# Patient Record
Sex: FEMALE | Race: WHITE | NOT HISPANIC OR LATINO | Employment: OTHER | URBAN - METROPOLITAN AREA
[De-identification: names, ages, dates, MRNs, and addresses within clinical notes are randomized per-mention and may not be internally consistent; named-entity substitution may affect disease eponyms.]

---

## 2017-03-12 ENCOUNTER — HOSPITAL ENCOUNTER (OUTPATIENT)
Dept: RADIOLOGY | Facility: MEDICAL CENTER | Age: 72
End: 2017-03-12
Attending: FAMILY MEDICINE
Payer: MEDICARE

## 2017-03-12 DIAGNOSIS — R26.9 ABNORMALITY OF GAIT AND MOBILITY: ICD-10-CM

## 2017-03-12 PROCEDURE — 70553 MRI BRAIN STEM W/O & W/DYE: CPT

## 2017-03-12 PROCEDURE — 700117 HCHG RX CONTRAST REV CODE 255: Performed by: FAMILY MEDICINE

## 2017-03-12 PROCEDURE — A9579 GAD-BASE MR CONTRAST NOS,1ML: HCPCS | Performed by: FAMILY MEDICINE

## 2017-03-12 RX ADMIN — GADODIAMIDE 20 ML: 287 INJECTION INTRAVENOUS at 10:53

## 2017-03-30 ENCOUNTER — HOSPITAL ENCOUNTER (OUTPATIENT)
Dept: RADIOLOGY | Facility: MEDICAL CENTER | Age: 72
End: 2017-03-30
Attending: FAMILY MEDICINE
Payer: MEDICARE

## 2017-03-30 DIAGNOSIS — R26.9 ABNORMALITY OF GAIT: ICD-10-CM

## 2017-03-30 PROCEDURE — 72141 MRI NECK SPINE W/O DYE: CPT

## 2017-03-30 PROCEDURE — 72040 X-RAY EXAM NECK SPINE 2-3 VW: CPT

## 2017-06-01 ENCOUNTER — HOSPITAL ENCOUNTER (OUTPATIENT)
Dept: PHYSICAL THERAPY | Facility: REHABILITATION | Age: 72
End: 2017-06-01
Attending: SPECIALIST
Payer: MEDICARE

## 2017-06-01 PROCEDURE — G8978 MOBILITY CURRENT STATUS: HCPCS | Mod: CK

## 2017-06-01 PROCEDURE — 95992 CANALITH REPOSITIONING PROC: CPT

## 2017-06-01 PROCEDURE — G8979 MOBILITY GOAL STATUS: HCPCS | Mod: CI

## 2017-06-01 PROCEDURE — 97162 PT EVAL MOD COMPLEX 30 MIN: CPT

## 2017-06-05 ENCOUNTER — HOSPITAL ENCOUNTER (OUTPATIENT)
Dept: PHYSICAL THERAPY | Facility: REHABILITATION | Age: 72
End: 2017-06-05
Attending: SPECIALIST
Payer: MEDICARE

## 2017-06-05 PROCEDURE — 97112 NEUROMUSCULAR REEDUCATION: CPT

## 2017-06-05 PROCEDURE — 97110 THERAPEUTIC EXERCISES: CPT

## 2017-06-08 ENCOUNTER — HOSPITAL ENCOUNTER (OUTPATIENT)
Dept: PHYSICAL THERAPY | Facility: REHABILITATION | Age: 72
End: 2017-06-08
Attending: SPECIALIST
Payer: MEDICARE

## 2017-06-08 PROCEDURE — 97112 NEUROMUSCULAR REEDUCATION: CPT

## 2017-06-12 ENCOUNTER — HOSPITAL ENCOUNTER (OUTPATIENT)
Dept: PHYSICAL THERAPY | Facility: REHABILITATION | Age: 72
End: 2017-06-12
Attending: SPECIALIST
Payer: MEDICARE

## 2017-06-12 PROCEDURE — 97112 NEUROMUSCULAR REEDUCATION: CPT

## 2017-06-14 ENCOUNTER — HOSPITAL ENCOUNTER (OUTPATIENT)
Dept: PHYSICAL THERAPY | Facility: REHABILITATION | Age: 72
End: 2017-06-14
Attending: SPECIALIST
Payer: MEDICARE

## 2017-06-14 PROCEDURE — 97110 THERAPEUTIC EXERCISES: CPT | Mod: XU

## 2017-06-14 PROCEDURE — 95992 CANALITH REPOSITIONING PROC: CPT

## 2017-06-19 ENCOUNTER — APPOINTMENT (OUTPATIENT)
Dept: PHYSICAL THERAPY | Facility: REHABILITATION | Age: 72
End: 2017-06-19
Attending: SPECIALIST
Payer: MEDICARE

## 2017-06-21 ENCOUNTER — APPOINTMENT (OUTPATIENT)
Dept: PHYSICAL THERAPY | Facility: REHABILITATION | Age: 72
End: 2017-06-21
Attending: SPECIALIST
Payer: MEDICARE

## 2017-06-23 ENCOUNTER — HOSPITAL ENCOUNTER (OUTPATIENT)
Dept: RADIOLOGY | Facility: MEDICAL CENTER | Age: 72
End: 2017-06-23
Attending: FAMILY MEDICINE
Payer: MEDICARE

## 2017-06-23 DIAGNOSIS — Z12.31 VISIT FOR SCREENING MAMMOGRAM: ICD-10-CM

## 2017-06-23 PROCEDURE — 77063 BREAST TOMOSYNTHESIS BI: CPT

## 2017-06-26 ENCOUNTER — APPOINTMENT (OUTPATIENT)
Dept: PHYSICAL THERAPY | Facility: REHABILITATION | Age: 72
End: 2017-06-26
Attending: SPECIALIST
Payer: MEDICARE

## 2018-03-26 ENCOUNTER — OFFICE VISIT (OUTPATIENT)
Dept: DERMATOLOGY | Facility: IMAGING CENTER | Age: 73
End: 2018-03-26
Payer: MEDICARE

## 2018-03-26 VITALS — TEMPERATURE: 97.5 F | WEIGHT: 195 LBS | BODY MASS INDEX: 29.55 KG/M2 | HEIGHT: 68 IN

## 2018-03-26 DIAGNOSIS — L57.0 ACTINIC KERATOSES: ICD-10-CM

## 2018-03-26 DIAGNOSIS — L71.8 ACNE ROSACEA, ERYTHEMATOUS TELANGIECTATIC TYPE: ICD-10-CM

## 2018-03-26 DIAGNOSIS — L82.1 SEBORRHEIC KERATOSES: ICD-10-CM

## 2018-03-26 DIAGNOSIS — L30.9 DERMATITIS: ICD-10-CM

## 2018-03-26 PROCEDURE — 17000 DESTRUCT PREMALG LESION: CPT | Performed by: DERMATOLOGY

## 2018-03-26 PROCEDURE — 99203 OFFICE O/P NEW LOW 30 MIN: CPT | Mod: 25 | Performed by: DERMATOLOGY

## 2018-03-26 PROCEDURE — 17003 DESTRUCT PREMALG LES 2-14: CPT | Performed by: DERMATOLOGY

## 2018-03-26 RX ORDER — TRIAMCINOLONE ACETONIDE 0.25 MG/G
1 OINTMENT TOPICAL 2 TIMES DAILY
Qty: 30 G | Refills: 1 | Status: SHIPPED | OUTPATIENT
Start: 2018-03-26 | End: 2022-02-08

## 2018-03-26 RX ORDER — SOLIFENACIN SUCCINATE 5 MG/1
10 TABLET, FILM COATED ORAL DAILY
COMMUNITY
End: 2019-02-08

## 2018-03-26 ASSESSMENT — ENCOUNTER SYMPTOMS
WEIGHT LOSS: 0
CHILLS: 0
FEVER: 0

## 2018-03-26 NOTE — PROGRESS NOTES
Dermatology New Patient Visit    Chief Complaint   Patient presents with   • Other     Skin cancer screening       Subjective:     HPI:   Ashanti Day is a 72 y.o. female presenting for    Full body skin exam - patient has h/o several precancerous lesions  Last exam a year ago   Rough spots on the forehead  No recent change in size  No itching/bleeding/pain  No treatments    Tan spots all over the body  Present for years  Has had a few frozen in the past    Rash on central chest  Present for 1 week  Not very itchy  No aggravating/alleviating factors  No treatments    Redness of the central face  Issue for 5+ years  No aggravating/alleviating factors  No itching/bleeding/pain  Was given a rosacea cream ~5 years ago, did not help very much  Not regularly using sunscreen    History of skin cancer: Yes, Details:  Precancerous  skin lesion removed from face x 5 years ago   History of biopsies:Yes, Details:    History of blistering/severe sunburns:No  Family history of skin cancer:No  Family history of atypical moles:No        Past Medical History:   Diagnosis Date   • Anesthesia     nausea   • Arthritis    • Breast cancer    • Bronchitis 1960's   • Cancer 2012    ca left breast   • Fibroid uterus 1979    resulting in Hysterectomy   • Heart burn    • Hepatitis A     at  age 6 months   • High cholesterol    • Ovarian mass current-2012    on each ovary, uterine fibroids   • Pneumonia 2003   • Sleep apnea     CPAP   • Snoring     SLEEP STUDY DONE   • Unspecified urinary incontinence        No current outpatient prescriptions on file prior to visit.     No current facility-administered medications on file prior to visit.        No Known Allergies    No family history on file.    Social History     Social History   • Marital status:      Spouse name: N/A   • Number of children: N/A   • Years of education: N/A     Occupational History   • Not on file.     Social History Main Topics   • Smoking status: Former  "Smoker     Packs/day: 1.00     Years: 30.00     Types: Cigarettes     Quit date: 1/5/1992   • Smokeless tobacco: Not on file   • Alcohol use No   • Drug use: No   • Sexual activity: Not on file     Other Topics Concern   • Family Contact Information Yes     Social History Narrative   • No narrative on file       Review of Systems   Constitutional: Negative for chills, fever and weight loss.   Skin: Positive for rash. Negative for itching.   All other systems reviewed and are negative.       Objective:     A full mucocutaneous exam was completed including: scalp, hair, ears, face, eyelids, conjunctiva, lips, gums/tongue/oropharynx, neck, chest breasts, abdomen, back, left and right upper extremities (including hands/digits and fingernails), left and right lower extremities (including feet/toes, toenails), buttocks, excluding external genitalia (patient refusal) with the following pertinent findings listed below. Remaining above-listed examined areas within normal limits / negative for rashes or lesions.    Temperature 36.4 °C (97.5 °F), height 1.727 m (5' 8\"), weight 88.5 kg (195 lb), last menstrual period 01/04/1979.    Physical Exam   Constitutional: She is oriented to person, place, and time and well-developed, well-nourished, and in no distress.   HENT:   Head: Normocephalic and atraumatic.       Right Ear: External ear normal.   Left Ear: External ear normal.   Nose: Nose normal.   Mouth/Throat: Oropharynx is clear and moist.   Eyes: Conjunctivae and lids are normal.   Neck: Normal range of motion. Neck supple.   Cardiovascular: Intact distal pulses.    Pulmonary/Chest: Effort normal.   Neurological: She is alert and oriented to person, place, and time.   Skin: Skin is warm and dry.        Psychiatric: Mood and affect normal.   Vitals reviewed.      DATA: none applicable to review    Assessment and Plan:     1. Actinic keratoses - forehead, temple  CRYOTHERAPY:  Discussed risks and benefits of cryotherapy. " Patient verbally agreed. 2 applications of cryotherapy were applied to 4 lesions on the forehead, temples. Patient tolerated procedure well. Aftercare instructions given.    2. Dermatitis - upper abdomen  - start triamcinolone 0.1% ointment to affected area of the body twice daily until improved. Side effects discussed, including skin thinning, appearance of stretch marks (striae), easy bruising, telangiectasias, and possible increased hair growth     3. Seborrheic keratoses  - Benign-appearing nature of lesions discussed. Advised to return to clinic for any new or concerning changes.  - ABCDE's of melanoma discussed    4. Rosacea, erythematous telangiectatic type  - educated patient about diagnosis, management options, and expectations of treatment  - discussed option of Mirvaso or Rhofade, but patient declines today  - discussed importance of regular sun protection/sunscreen use, SPF 30 or greater with broad spectrum coverage, need for reapplication every  minutes  - trigger avoidance  - discussed that the redness/flushing is more difficult to treat, and discussed other topicals vs BBL vs laser therapy    Followup: Return in about 1 year (around 3/26/2019) for lizzie.    Priscila Lemus M.D.

## 2018-04-12 ENCOUNTER — APPOINTMENT (OUTPATIENT)
Dept: RADIOLOGY | Facility: MEDICAL CENTER | Age: 73
End: 2018-04-12
Attending: PSYCHIATRY & NEUROLOGY
Payer: MEDICARE

## 2018-04-12 DIAGNOSIS — G95.9 MYELOPATHY (HCC): ICD-10-CM

## 2018-04-12 PROCEDURE — 72141 MRI NECK SPINE W/O DYE: CPT

## 2018-05-22 ENCOUNTER — HOSPITAL ENCOUNTER (OUTPATIENT)
Dept: RADIOLOGY | Facility: MEDICAL CENTER | Age: 73
End: 2018-05-22
Attending: FAMILY MEDICINE
Payer: MEDICARE

## 2018-05-22 DIAGNOSIS — M54.50 LOW BACK PAIN WITHOUT SCIATICA, UNSPECIFIED BACK PAIN LATERALITY, UNSPECIFIED CHRONICITY: ICD-10-CM

## 2018-05-22 PROCEDURE — 72100 X-RAY EXAM L-S SPINE 2/3 VWS: CPT

## 2018-06-09 ENCOUNTER — HOSPITAL ENCOUNTER (OUTPATIENT)
Dept: RADIOLOGY | Facility: MEDICAL CENTER | Age: 73
End: 2018-06-09
Attending: PSYCHIATRY & NEUROLOGY
Payer: MEDICARE

## 2018-06-09 DIAGNOSIS — R27.0 ATAXIA: ICD-10-CM

## 2018-06-09 PROCEDURE — 70551 MRI BRAIN STEM W/O DYE: CPT

## 2018-06-28 ENCOUNTER — HOSPITAL ENCOUNTER (OUTPATIENT)
Dept: RADIOLOGY | Facility: MEDICAL CENTER | Age: 73
End: 2018-06-28
Attending: FAMILY MEDICINE
Payer: MEDICARE

## 2018-06-28 ENCOUNTER — HOSPITAL ENCOUNTER (OUTPATIENT)
Dept: RADIOLOGY | Facility: MEDICAL CENTER | Age: 73
End: 2018-06-28
Attending: PSYCHIATRY & NEUROLOGY
Payer: MEDICARE

## 2018-06-28 DIAGNOSIS — M54.5 LOW BACK PAIN, UNSPECIFIED BACK PAIN LATERALITY, UNSPECIFIED CHRONICITY, WITH SCIATICA PRESENCE UNSPECIFIED: ICD-10-CM

## 2018-06-28 DIAGNOSIS — Z12.39 SCREENING FOR BREAST CANCER: ICD-10-CM

## 2018-06-28 PROCEDURE — 77063 BREAST TOMOSYNTHESIS BI: CPT

## 2018-06-28 PROCEDURE — 72100 X-RAY EXAM L-S SPINE 2/3 VWS: CPT

## 2018-07-02 ENCOUNTER — HOSPITAL ENCOUNTER (OUTPATIENT)
Dept: RADIOLOGY | Facility: MEDICAL CENTER | Age: 73
End: 2018-07-02
Attending: FAMILY MEDICINE
Payer: MEDICARE

## 2018-07-02 DIAGNOSIS — Z78.0 MENOPAUSE: ICD-10-CM

## 2018-07-02 PROCEDURE — 77080 DXA BONE DENSITY AXIAL: CPT

## 2018-07-11 ENCOUNTER — HOSPITAL ENCOUNTER (OUTPATIENT)
Dept: RADIOLOGY | Facility: MEDICAL CENTER | Age: 73
End: 2018-07-11
Attending: PSYCHIATRY & NEUROLOGY
Payer: MEDICARE

## 2018-07-11 DIAGNOSIS — M54.50 LOW BACK PAIN WITHOUT SCIATICA, UNSPECIFIED BACK PAIN LATERALITY, UNSPECIFIED CHRONICITY: ICD-10-CM

## 2018-07-11 PROCEDURE — 72148 MRI LUMBAR SPINE W/O DYE: CPT

## 2018-10-09 ENCOUNTER — HOSPITAL ENCOUNTER (OUTPATIENT)
Dept: RADIOLOGY | Facility: MEDICAL CENTER | Age: 73
End: 2018-10-09
Attending: FAMILY MEDICINE
Payer: MEDICARE

## 2018-10-09 DIAGNOSIS — R06.03 ACUTE RESPIRATORY DISTRESS: ICD-10-CM

## 2018-10-09 PROCEDURE — 71046 X-RAY EXAM CHEST 2 VIEWS: CPT

## 2018-12-10 ENCOUNTER — TELEPHONE (OUTPATIENT)
Dept: PULMONOLOGY | Facility: HOSPICE | Age: 73
End: 2018-12-10

## 2018-12-10 DIAGNOSIS — R06.02 SOB (SHORTNESS OF BREATH): Primary | ICD-10-CM

## 2018-12-12 ENCOUNTER — OFFICE VISIT (OUTPATIENT)
Dept: PULMONOLOGY | Facility: HOSPICE | Age: 73
End: 2018-12-12
Payer: MEDICARE

## 2018-12-12 ENCOUNTER — NON-PROVIDER VISIT (OUTPATIENT)
Dept: PULMONOLOGY | Facility: HOSPICE | Age: 73
End: 2018-12-12
Payer: MEDICARE

## 2018-12-12 VITALS
TEMPERATURE: 99.1 F | OXYGEN SATURATION: 90 % | HEART RATE: 88 BPM | DIASTOLIC BLOOD PRESSURE: 90 MMHG | BODY MASS INDEX: 30.62 KG/M2 | SYSTOLIC BLOOD PRESSURE: 142 MMHG | RESPIRATION RATE: 16 BRPM | WEIGHT: 202 LBS | HEIGHT: 68 IN

## 2018-12-12 VITALS — WEIGHT: 202 LBS | BODY MASS INDEX: 30.71 KG/M2

## 2018-12-12 DIAGNOSIS — R06.02 SHORTNESS OF BREATH: ICD-10-CM

## 2018-12-12 DIAGNOSIS — R06.02 SOB (SHORTNESS OF BREATH): ICD-10-CM

## 2018-12-12 PROCEDURE — 94726 PLETHYSMOGRAPHY LUNG VOLUMES: CPT | Performed by: INTERNAL MEDICINE

## 2018-12-12 PROCEDURE — 94060 EVALUATION OF WHEEZING: CPT | Performed by: INTERNAL MEDICINE

## 2018-12-12 PROCEDURE — 94729 DIFFUSING CAPACITY: CPT | Performed by: INTERNAL MEDICINE

## 2018-12-12 PROCEDURE — 99204 OFFICE O/P NEW MOD 45 MIN: CPT | Mod: 25 | Performed by: INTERNAL MEDICINE

## 2018-12-12 RX ORDER — SOLIFENACIN SUCCINATE 10 MG/1
TABLET, FILM COATED ORAL
COMMUNITY
Start: 2018-11-11 | End: 2022-02-08

## 2018-12-12 RX ORDER — INFLUENZA A VIRUS A/MICHIGAN/45/2015 X-275 (H1N1) ANTIGEN (FORMALDEHYDE INACTIVATED), INFLUENZA A VIRUS A/SINGAPORE/INFIMH-16-0019/2016 IVR-186 (H3N2) ANTIGEN (FORMALDEHYDE INACTIVATED), AND INFLUENZA B VIRUS B/MARYLAND/15/2016 BX-69A (A B/COLORADO/6/2017-LIKE VIRUS) ANTIGEN (FORMALDEHYDE INACTIVATED) 60; 60; 60 UG/.5ML; UG/.5ML; UG/.5ML
INJECTION, SUSPENSION INTRAMUSCULAR
Refills: 0 | COMMUNITY
Start: 2018-09-25 | End: 2019-02-08

## 2018-12-12 ASSESSMENT — PULMONARY FUNCTION TESTS
FEV1/FVC_PERCENT_PREDICTED: 106
FEV1/FVC_PERCENT_PREDICTED: 106
FEV1/FVC_PERCENT_CHANGE: 4
FEV1/FVC_PERCENT_PREDICTED: 75
FEV1/FVC: 76
FEV1_LLN: 2.10
FEV1/FVC: 79.58
FEV1/FVC: 80
FEV1_PERCENT_PREDICTED: 87
FEV1_PREDICTED: 2.51
FVC_LLN: 2.78
FEV1/FVC: 76
FVC: 2.84
FEV1/FVC_PREDICTED: 75
FVC_PREDICTED: 3.33
FEV1/FVC_PERCENT_CHANGE: -200
FEV1/FVC_PERCENT_PREDICTED: 101
FEV1/FVC_PERCENT_LLN: 63
FVC_PERCENT_PREDICTED: 86
FEV1/FVC_PERCENT_PREDICTED: 101
FEV1_PERCENT_CHANGE: 2
FEV1: 2.26
FVC: 2.89
FEV1_PERCENT_CHANGE: -1
FVC_PERCENT_PREDICTED: 85
FEV1_PERCENT_PREDICTED: 90
FEV1: 2.2

## 2018-12-12 ASSESSMENT — ENCOUNTER SYMPTOMS
EYES NEGATIVE: 1
CARDIOVASCULAR NEGATIVE: 1
HEMOPTYSIS: 0
SPEECH CHANGE: 1
WHEEZING: 0
COUGH: 1
SHORTNESS OF BREATH: 1
HEARTBURN: 1
WEAKNESS: 1
FOCAL WEAKNESS: 1
TREMORS: 0
SPUTUM PRODUCTION: 0
FALLS: 1

## 2018-12-12 ASSESSMENT — PATIENT HEALTH QUESTIONNAIRE - PHQ9: CLINICAL INTERPRETATION OF PHQ2 SCORE: 0

## 2018-12-12 NOTE — ASSESSMENT & PLAN NOTE
I do not think patient has parenchymal lung disease.  Shortness of breath at least per history and exam smoke consistent with upper body weakness and visits with cooking and making her bed versus actually just talking and walking.  This may be related to her underlying neurological deficit that is ongoing with her balance and lower extremity weakness.  Her speech is also affected where she is having problems finding words.  And she is working with a neurologist.  Reviewed results with patient pulmonary function testing completely normal.    Thus think her shortness of breath is related to upper body muscle weakness and not parenchymal lung disease.  Weakness is not severe enough to reduce her total lung capacity or her maximum voluntary ventilation.

## 2018-12-12 NOTE — PROGRESS NOTES
Pulmonary Consultation    Date of Service: 2018    Consulting Physician: Judy Waite M.D.    Reason for consult:  Establish Care and Shortness of Breath      Problem List Items Addressed This Visit     Shortness of breath     I do not think patient has parenchymal lung disease.  Shortness of breath at least per history and exam smoke consistent with upper body weakness and visits with cooking and making her bed versus actually just talking and walking.  This may be related to her underlying neurological deficit that is ongoing with her balance and lower extremity weakness.  Her speech is also affected where she is having problems finding words.  And she is working with a neurologist.  Reviewed results with patient pulmonary function testing completely normal.    Thus think her shortness of breath is related to upper body muscle weakness and not parenchymal lung disease.  Weakness is not severe enough to reduce her total lung capacity or her maximum voluntary ventilation.                 History of Present Illness: Ashanti Day is a 73 y.o. female with a past medical history of Breast ca, JAVIER that is non compliant   Referred for SOB  Started last 2 weeks of her radiation therapy for breast cancer on the left in   SOB not with walking and not with sitting  She gets it with cooking or making her bed  Not associated with wheeze  Dry cough  Difficulty speaking, writing and balance -- neurology is working with her      Exercise tolerance:  Walking distance: balance is the issue - so does not walk much as broken tail bone and collar bone  [place]: can't due to balance      Night time symptoms: none    Pulmonary History:    Environmental exposures: no hot tub; no woodstove, no mining work, and no asbestos exposure      Originally from [place] moved to [place]   Pets: no  Occupation History:as above admin work  Family history of pulmonary disease: mother who  yesterday no lung  problems  Second hand smoke exposure: none    Quit 1991 35pk year    Review of Systems   Constitutional: Positive for malaise/fatigue.   HENT: Negative.    Eyes: Negative.    Respiratory: Positive for cough and shortness of breath. Negative for hemoptysis, sputum production and wheezing.         Her cough is occasional and nonproductive and it is usually when she is having the shortness of breath and after she recovers she will cough a couple of times.   Cardiovascular: Negative.    Gastrointestinal: Positive for heartburn.   Genitourinary: Negative.    Musculoskeletal: Positive for falls.   Skin: Negative.    Neurological: Positive for speech change, focal weakness and weakness. Negative for tremors.   Endo/Heme/Allergies: Negative.        Current Outpatient Prescriptions on File Prior to Visit   Medication Sig Dispense Refill   • solifenacin (VESICARE) 5 MG tablet Take 10 mg by mouth every day.     • Famotidine (PEPCID PO) Take  by mouth.     • triamcinolone (KENALOG) 0.025 % ointment Apply 1 Application to affected area(s) 2 times a day. 30 g 1     No current facility-administered medications on file prior to visit.        Social History   Substance Use Topics   • Smoking status: Former Smoker     Packs/day: 1.00     Years: 35.00     Types: Cigarettes     Quit date: 1/5/1992   • Smokeless tobacco: Never Used   • Alcohol use No        Past Medical History:   Diagnosis Date   • Anesthesia     nausea   • Arthritis    • Breast cancer (HCC)    • Bronchitis 1960's   • Cancer (HCC) 2012    ca left breast   • Fibroid uterus 1979    resulting in Hysterectomy   • Heart burn    • Hepatitis A     at  age 6 months   • High cholesterol    • Ovarian mass current-2012    on each ovary, uterine fibroids   • Pneumonia 2003   • Sleep apnea     CPAP   • Snoring     SLEEP STUDY DONE   • Unspecified urinary incontinence        Past Surgical History:   Procedure Laterality Date   • ESWT Right 5/13/2015    Procedure: EXTRACORPOREAL  "SHOCK WAVE THERAPY;  Surgeon: KRIS Fernandez D.P.M.;  Location: SURGERY [facility];  Service:    • NODE BIOPSY SENTINEL  12/7/2012    Performed by Angélica Jackson M.D. at SURGERY [facility] ORS   • BREAST BIOPSY  12/7/2012    Performed by Angélica Jackson M.D. at SURGERY [facility]   • BREAST BIOPSY  11/20/2012    Performed by Angélica Jackson M.D. at SURGERY SAME DAY [facility]   • LAPAROSCOPY  1/5/2012    Performed by REECE FLORES at SURGERY [facility] ORS   • EXPLORATORY LAPAROTOMY  1/5/2012    Performed by REECE FLORES at SURGERY [facility]   • SHOULDER SURGERY  july 2011    Right shoulder. with tendon repair   • LUMBAR DECOMPRESSION  1995   • HYSTERECTOMY RADICAL  1979   • OTHER ORTHOPEDIC SURGERY  1977    cervical laminectomy infusion   • LUMPECTOMY     • PB RADIATION THERAPY PLAN SIMPLE         Allergies: Patient has no known allergies.    History reviewed. No pertinent family history.    Vitals:    12/12/18 0901 12/12/18 0908   Height: 1.727 m (5' 8\")    Weight: 91.6 kg (202 lb)    Weight % change since last entry.: 0 %    BP: 142/90    Pulse: 88    BMI (Calculated): 30.71    Resp: 16    Temp: 37.3 °C (99.1 °F)    TempSrc: Temporal    O2 sat % room air:  (!) 90 %       Physical Examination  Physical Exam   Constitutional: She is oriented to person, place, and time. No distress.   HENT:   Head: Normocephalic and atraumatic.   Right Ear: External ear normal.   Left Ear: External ear normal.   Mouth/Throat: Oropharynx is clear and moist.   Eyes: Pupils are equal, round, and reactive to light. Conjunctivae and EOM are normal.   Neck: Normal range of motion. Neck supple. No JVD present. No tracheal deviation present. No thyromegaly present.   Cardiovascular: Normal rate, regular rhythm and intact distal pulses.  Exam reveals no gallop and no friction rub.    No murmur heard.  Pulmonary/Chest: No stridor. No respiratory distress. She has no wheezes. She has no rales. She exhibits no " tenderness.   Abdominal: Soft. Bowel sounds are normal.   Musculoskeletal: She exhibits no edema, tenderness or deformity.   To get up from seated position she has to use her upper body strength   Legs 4/5 b/l   Lymphadenopathy:     She has no cervical adenopathy.   Neurological: She is alert and oriented to person, place, and time. No cranial nerve deficit. Coordination normal. GCS score is 15.   Uses a cane to walk   Skin: Skin is warm and dry. No rash noted. She is not diaphoretic.   Psychiatric: Mood, affect and judgment normal.         Results:    Pulmonary function tests  Acceptable and reproducible  FEV1 2.2 L in brackets 87%], FVC 2.89 L in brackets 86%], ratio 76%  Flow volume loops relatively normal-appearing  Total lung capacity 5.65 L [99%] 5 DLCO 23.04 mL's per minute millimeter mercury [112%]    Impression normal pulmonary  function testing with no response to bronchodilators  Other pertinent testing:  CT scan done in December 2013 of the abdomen I could see the lower lungs in the parenchyma of the lungs appear normal      Armani Dietrich MD MPH [facility]  [facility]

## 2018-12-12 NOTE — PROCEDURES
Technician Reny Lezama, [facility] Comments:Good patient effort & cooperation.  The results of this test meet the ATS/ERS standards for acceptability & reproducibility.  Test was performed on the Smart Surgical Body Plethysmograph-Elite DX system.  Predicted values were NHanes-3 for spirometry, [facility] for DLCO, ITS for Lung Volumes.  The DLCO was uncorrected for Hgb.  A bronchodilator of Xopenex HFA -2puffs via spacer administered.  DLCO performed during dilation period.    Acceptable and reproducible  FEV1 2.2 L in brackets 87%], FVC 2.89 L in brackets 86%], ratio 76%  Flow volume loops relatively normal-appearing  Total lung capacity 5.65 L [99%] 5 DLCO 23.04 mL's per minute millimeter mercury [112%]     Impression normal pulmonary  function testing with no response to bronchodilators    Interpretation:

## 2018-12-27 ENCOUNTER — TELEPHONE (OUTPATIENT)
Dept: CARDIOLOGY | Facility: MEDICAL CENTER | Age: 73
End: 2018-12-27

## 2018-12-27 NOTE — TELEPHONE ENCOUNTER
I sent an MICHAEL to [facility] to obtain all prior records for the patient's appointment with Dr. Loyd on 12/31/18 @ 1300. Records will be sent to [phone].    [facility]:  Tel: [phone]  Fax: [phone]

## 2019-02-08 ENCOUNTER — OFFICE VISIT (OUTPATIENT)
Dept: CARDIOLOGY | Facility: MEDICAL CENTER | Age: 74
End: 2019-02-08
Payer: MEDICARE

## 2019-02-08 VITALS
WEIGHT: 195 LBS | BODY MASS INDEX: 29.55 KG/M2 | HEIGHT: 68 IN | SYSTOLIC BLOOD PRESSURE: 144 MMHG | DIASTOLIC BLOOD PRESSURE: 84 MMHG | HEART RATE: 96 BPM

## 2019-02-08 DIAGNOSIS — R94.31 NONSPECIFIC ABNORMAL ELECTROCARDIOGRAM (ECG) (EKG): Primary | ICD-10-CM

## 2019-02-08 DIAGNOSIS — R06.09 DOE (DYSPNEA ON EXERTION): ICD-10-CM

## 2019-02-08 DIAGNOSIS — R94.31 ABNORMAL EKG: ICD-10-CM

## 2019-02-08 LAB — EKG IMPRESSION: NORMAL

## 2019-02-08 PROCEDURE — 93000 ELECTROCARDIOGRAM COMPLETE: CPT | Performed by: INTERNAL MEDICINE

## 2019-02-08 PROCEDURE — 99204 OFFICE O/P NEW MOD 45 MIN: CPT | Performed by: INTERNAL MEDICINE

## 2019-02-08 ASSESSMENT — ENCOUNTER SYMPTOMS
ABDOMINAL PAIN: 0
FALLS: 1
SHORTNESS OF BREATH: 1
LOSS OF CONSCIOUSNESS: 0
CHILLS: 0
FEVER: 1
ORTHOPNEA: 0
MYALGIAS: 0
PALPITATIONS: 0
BLURRED VISION: 0
HEARTBURN: 1
INSOMNIA: 0
PND: 0
DIZZINESS: 0
SPEECH CHANGE: 1

## 2019-02-08 NOTE — PROGRESS NOTES
Chief Complaint   Patient presents with   •  Dyspnea on exertion       Subjective:   Ashanti Day is a 73 y.o. female who presents today is referred by her PCP Dr.Ambika JOSE Waite for a cardiology consultation for evaluation of chronic shortness of breath and an abnormal EKG.    The patient has significant past medical history of dyspnea on exertion since 2014.  At that time she was seen by Dr. Edelmira Toledo, [facility] cardiologist and had an echocardiogram and a nuclear stress test both of which were felt to be unremarkable.  Dr. Toledo met with the patient indicated that she did not feel that the patient had a cardiac etiology to explain her shortness of breath.  Also in 2014 she was seen by [facility].  Sleep study showed moderately severe sleep apnea however the patient does not tolerate the CPAP device being only able to use it an hour at night.  PFTs were normal.    The patient reports continued significant exertional shortness of breath with minimal activity such as daily chores, making her bed or running errands.  She denies any associated chest neck or arm discomfort.  No PND, orthopnea or lower extremity edema.    Additionally she has a history of dyslipidemia, GE reflux disease, spinal arthritis, C-spine fusion 1979, recurrent falls resulting in sacral fractures, breast cancer with left lumpectomy and radiation therapy in 2013.    She is been seen by Dr. Darrel Francois, neurologist for balance problems and lower extremity weakness.    Family history negative for heart disease.    Social history  Has a live-in roommate.  Previously smoked 35 pack years quit 20 years ago.    Past Medical History:   Diagnosis Date   • Anesthesia     nausea   • Arthritis    • Breast cancer (HCC)    • Bronchitis 1960's   • Cancer (HCC) 2012    ca left breast   • Fibroid uterus 1979    resulting in Hysterectomy   • Heart burn    • Hepatitis A     at  age 6 months   • High cholesterol     • Ovarian mass current-2012    on each ovary, uterine fibroids   • Pneumonia 2003   • Sleep apnea     CPAP   • Snoring     SLEEP STUDY DONE   • Unspecified urinary incontinence      Past Surgical History:   Procedure Laterality Date   • ESWT Right 5/13/2015    Procedure: EXTRACORPOREAL SHOCK WAVE THERAPY;  Surgeon: KRIS Fernandez D.P.M.;  Location: SURGERY [facility];  Service:    • NODE BIOPSY SENTINEL  12/7/2012    Performed by Angélica Jackson M.D. at SURGERY [facility]   • BREAST BIOPSY  12/7/2012    Performed by Angélica Jackson M.D. at SURGERY [facility]   • BREAST BIOPSY  11/20/2012    Performed by Angélica Jackson M.D. at SURGERY SAME DAY [facility]   • LAPAROSCOPY  1/5/2012    Performed by REECE FLORES at SURGERY [facility]   • EXPLORATORY LAPAROTOMY  1/5/2012    Performed by REECE FLORES at SURGERY [facility]   • SHOULDER SURGERY  july 2011    Right shoulder. with tendon repair   • LUMBAR DECOMPRESSION  1995   • HYSTERECTOMY RADICAL  1979   • OTHER ORTHOPEDIC SURGERY  1977    cervical laminectomy infusion   • LUMPECTOMY     • PB RADIATION THERAPY PLAN SIMPLE       History reviewed. No pertinent family history.  Social History     Social History   • Marital status:      Spouse name: N/A   • Number of children: N/A   • Years of education: N/A     Occupational History   • Not on file.     Social History Main Topics   • Smoking status: Former Smoker     Packs/day: 1.00     Years: 35.00     Types: Cigarettes     Quit date: 1/5/1992   • Smokeless tobacco: Never Used   • Alcohol use No   • Drug use: No   • Sexual activity: Not on file     Other Topics Concern   • Family Contact Information Yes     Social History Narrative   • No narrative on file     No Known Allergies  Outpatient Encounter Prescriptions as of 2/8/2019   Medication Sig Dispense Refill   • VESICARE 10 MG tablet      • [DISCONTINUED] FLUZONE HIGH-DOSE 0.5 ML Suspension Prefilled Syringe injection TO BE  "ADMINISTERED BY PHARMACIST FOR IMMUNIZATION  0   • [DISCONTINUED] vitamin D (CHOLECALCIFEROL) 1000 UNIT Tab Take 1,000 Units by mouth every day.     • [DISCONTINUED] Calcium Carbonate-Vit D-Min (CALCIUM 1200 PO) Take  by mouth.     • Famotidine (PEPCID PO) Take  by mouth.     • triamcinolone (KENALOG) 0.025 % ointment Apply 1 Application to affected area(s) 2 times a day. (Patient not taking: Reported on 2/8/2019) 30 g 1   • [DISCONTINUED] solifenacin (VESICARE) 5 MG tablet Take 10 mg by mouth every day.       No facility-administered encounter medications on file as of 2/8/2019.      Review of Systems   Constitutional: Positive for fever. Negative for chills.   HENT: Positive for tinnitus. Negative for congestion.    Eyes: Negative for blurred vision.   Respiratory: Positive for shortness of breath.    Cardiovascular: Negative for chest pain, palpitations, orthopnea, leg swelling and PND.   Gastrointestinal: Positive for heartburn. Negative for abdominal pain.   Genitourinary: Positive for urgency. Negative for dysuria.   Musculoskeletal: Positive for falls. Negative for joint pain and myalgias.   Skin: Negative for rash.   Neurological: Positive for speech change. Negative for dizziness and loss of consciousness.   Psychiatric/Behavioral: The patient does not have insomnia.         Objective:   /84 (BP Location: Left arm, Patient Position: Sitting, BP Cuff Size: Adult)   Pulse 96   Ht 1.727 m (5' 8\")   Wt 88.5 kg (195 lb)   LMP 01/04/1979   BMI 29.65 kg/m²     Physical Exam   Constitutional: She is oriented to person, place, and time. She appears well-nourished.   Frail.   HENT:   Head: Normocephalic and atraumatic.   Eyes: Pupils are equal, round, and reactive to light. EOM are normal. No scleral icterus.   Neck: Neck supple. No JVD present. No thyromegaly present.   Anterior neck scar.   Cardiovascular: Normal rate, regular rhythm, normal heart sounds and intact distal pulses.  Exam reveals no gallop " and no friction rub.    No murmur heard.  Pulmonary/Chest: Effort normal and breath sounds normal. No respiratory distress. She has no wheezes. She has no rales.   Abdominal: Soft. Bowel sounds are normal. She exhibits no mass. There is no tenderness.   Musculoskeletal: Normal range of motion. She exhibits no edema or deformity.   Lymphadenopathy:     She has no cervical adenopathy.   Neurological: She is alert and oriented to person, place, and time. No cranial nerve deficit. She exhibits normal muscle tone. Coordination abnormal.   Very insecure with walking.   Skin: Skin is warm and dry.   Psychiatric: She has a normal mood and affect. Her behavior is normal.   ECHOCARDIOGRAM 05/05/2014  Left ventricular ejection fraction 55-60%.  Aortic sclerosis.  Mild diastolic dysfunction.    MPI 02/11/2014  EF 61%.  Normal wall motion.  Moderate decreased apical attenuation at rest and stress most likely breast attenuation.    EKG 02/08/2019 normal sinus rhythm, rate 96, left anterior hemiblock, nonspecific ST-T wave abnormalities.  Reviewed by myself    Assessment:     1. Nonspecific abnormal electrocardiogram (ECG) (EKG)  EKG   2. FRANKLIN (dyspnea on exertion)  NM-CARDIAC STRESS TEST    EC-ECHOCARDIOGRAM COMPLETE W/O CONT   3. Abnormal EKG  NM-CARDIAC STRESS TEST    EC-ECHOCARDIOGRAM COMPLETE W/O CONT       Medical Decision Making:  Today's Assessment / Status / Plan:     Assessment  1.  Dyspnea on exertion, chronic, unclear etiology.  2.  Abnormal EKG with left anterior hemiblock.    Recommendation Discussion  1.  I had a detailed discussion with patient concerning her current symptomatology of exertional shortness of breath,, my uncertainty as to the etiology.  2.  I reviewed her previous diagnostic tests with her including her MPI, EKG, echocardiogram, sleep study chest x-ray and PFTs.  3.  We will repeat echocardiogram and nuclear stress test to make any further recommendations.    Thank you for allowing me see this lady  in consultation

## 2019-02-08 NOTE — LETTER
[facility]   [address] [place] [address]  [place] [address]  Phone: [phone]  Fax: [phone]              Ashanti Day  1945    Encounter Date: 2/8/2019    Tyler Melchor M.D.          PROGRESS NOTE:  Chief Complaint   Patient presents with   •  Dyspnea on exertion       Subjective:   Ashanti Day is a 73 y.o. female who presents today is referred by her PCP Dr.Ambika JOSE Waite for a cardiology consultation for evaluation of chronic shortness of breath and an abnormal EKG.    The patient has significant past medical history of dyspnea on exertion since 2014.  At that time she was seen by Dr. Edelmira Toledo, [facility] cardiologist and had an echocardiogram and a nuclear stress test both of which were felt to be unremarkable.  Dr. Toledo met with the patient indicated that she did not feel that the patient had a cardiac etiology to explain her shortness of breath.  Also in 2014 she was seen by [facility].  Sleep study showed moderately severe sleep apnea however the patient does not tolerate the CPAP device being only able to use it an hour at night.  PFTs were normal.    The patient reports continued significant exertional shortness of breath with minimal activity such as daily chores, making her bed or running errands.  She denies any associated chest neck or arm discomfort.  No PND, orthopnea or lower extremity edema.    Additionally she has a history of dyslipidemia, GE reflux disease, spinal arthritis, C-spine fusion 1979, recurrent falls resulting in sacral fractures, breast cancer with left lumpectomy and radiation therapy in 2013.    She is been seen by Dr. Darrel Francois, neurologist for balance problems and lower extremity weakness.    Family history negative for heart disease.    Social history  Has a live-in roommate.  Previously smoked 35 pack years quit 20 years ago.    Past Medical History:      Diagnosis Date   • Anesthesia     nausea   • Arthritis    • Breast cancer (HCC)    • Bronchitis 1960's   • Cancer (HCC) 2012    ca left breast   • Fibroid uterus 1979    resulting in Hysterectomy   • Heart burn    • Hepatitis A     at  age 6 months   • High cholesterol    • Ovarian mass current-2012    on each ovary, uterine fibroids   • Pneumonia 2003   • Sleep apnea     CPAP   • Snoring     SLEEP STUDY DONE   • Unspecified urinary incontinence      Past Surgical History:   Procedure Laterality Date   • ESWT Right 5/13/2015    Procedure: EXTRACORPOREAL SHOCK WAVE THERAPY;  Surgeon: KRIS eFrnandez D.P.M.;  Location: SURGERY [facility];  Service:    • NODE BIOPSY SENTINEL  12/7/2012    Performed by Angélica Jackson M.D. at SURGERY [facility]   • BREAST BIOPSY  12/7/2012    Performed by Angélica Jackson M.D. at SURGERY [facility]   • BREAST BIOPSY  11/20/2012    Performed by Angélica Jackson M.D. at SURGERY [facility]   • LAPAROSCOPY  1/5/2012    Performed by REECE FLORES at SURGERY [facility]   • EXPLORATORY LAPAROTOMY  1/5/2012    Performed by REECE FLORES at SURGERY [facility]   • SHOULDER SURGERY  july 2011    Right shoulder. with tendon repair   • LUMBAR DECOMPRESSION  1995   • HYSTERECTOMY RADICAL  1979   • OTHER ORTHOPEDIC SURGERY  1977    cervical laminectomy infusion   • LUMPECTOMY     • PB RADIATION THERAPY PLAN SIMPLE       History reviewed. No pertinent family history.  Social History     Social History   • Marital status:      Spouse name: N/A   • Number of children: N/A   • Years of education: N/A     Occupational History   • Not on file.     Social History Main Topics   • Smoking status: Former Smoker     Packs/day: 1.00     Years: 35.00     Types: Cigarettes     Quit date: 1/5/1992   • Smokeless tobacco: Never Used   • Alcohol use No   • Drug use: No   • Sexual activity: Not on file     Other Topics Concern   • Family Contact Information Yes     Social  "History Narrative   • No narrative on file     No Known Allergies  Outpatient Encounter Prescriptions as of 2/8/2019   Medication Sig Dispense Refill   • VESICARE 10 MG tablet      • [DISCONTINUED] FLUZONE HIGH-DOSE 0.5 ML Suspension Prefilled Syringe injection TO BE ADMINISTERED BY PHARMACIST FOR IMMUNIZATION  0   • [DISCONTINUED] vitamin D (CHOLECALCIFEROL) 1000 UNIT Tab Take 1,000 Units by mouth every day.     • [DISCONTINUED] Calcium Carbonate-Vit D-Min (CALCIUM 1200 PO) Take  by mouth.     • Famotidine (PEPCID PO) Take  by mouth.     • triamcinolone (KENALOG) 0.025 % ointment Apply 1 Application to affected area(s) 2 times a day. (Patient not taking: Reported on 2/8/2019) 30 g 1   • [DISCONTINUED] solifenacin (VESICARE) 5 MG tablet Take 10 mg by mouth every day.       No facility-administered encounter medications on file as of 2/8/2019.      Review of Systems   Constitutional: Positive for fever. Negative for chills.   HENT: Positive for tinnitus. Negative for congestion.    Eyes: Negative for blurred vision.   Respiratory: Positive for shortness of breath.    Cardiovascular: Negative for chest pain, palpitations, orthopnea, leg swelling and PND.   Gastrointestinal: Positive for heartburn. Negative for abdominal pain.   Genitourinary: Positive for urgency. Negative for dysuria.   Musculoskeletal: Positive for falls. Negative for joint pain and myalgias.   Skin: Negative for rash.   Neurological: Positive for speech change. Negative for dizziness and loss of consciousness.   Psychiatric/Behavioral: The patient does not have insomnia.         Objective:   /84 (BP Location: Left arm, Patient Position: Sitting, BP Cuff Size: Adult)   Pulse 96   Ht 1.727 m (5' 8\")   Wt 88.5 kg (195 lb)   LMP 01/04/1979   BMI 29.65 kg/m²      Physical Exam   Constitutional: She is oriented to person, place, and time. She appears well-nourished.   Frail.   HENT:   Head: Normocephalic and atraumatic.   Eyes: Pupils are " equal, round, and reactive to light. EOM are normal. No scleral icterus.   Neck: Neck supple. No JVD present. No thyromegaly present.   Anterior neck scar.   Cardiovascular: Normal rate, regular rhythm, normal heart sounds and intact distal pulses.  Exam reveals no gallop and no friction rub.    No murmur heard.  Pulmonary/Chest: Effort normal and breath sounds normal. No respiratory distress. She has no wheezes. She has no rales.   Abdominal: Soft. Bowel sounds are normal. She exhibits no mass. There is no tenderness.   Musculoskeletal: Normal range of motion. She exhibits no edema or deformity.   Lymphadenopathy:     She has no cervical adenopathy.   Neurological: She is alert and oriented to person, place, and time. No cranial nerve deficit. She exhibits normal muscle tone. Coordination abnormal.   Very insecure with walking.   Skin: Skin is warm and dry.   Psychiatric: She has a normal mood and affect. Her behavior is normal.   ECHOCARDIOGRAM 05/05/2014  Left ventricular ejection fraction 55-60%.  Aortic sclerosis.  Mild diastolic dysfunction.    MPI 02/11/2014  EF 61%.  Normal wall motion.  Moderate decreased apical attenuation at rest and stress most likely breast attenuation.    EKG 02/08/2019 normal sinus rhythm, rate 96, left anterior hemiblock, nonspecific ST-T wave abnormalities.  Reviewed by myself    Assessment:     1. Nonspecific abnormal electrocardiogram (ECG) (EKG)  EKG   2. FRANKLIN (dyspnea on exertion)  NM-CARDIAC STRESS TEST    EC-ECHOCARDIOGRAM COMPLETE W/O CONT   3. Abnormal EKG  NM-CARDIAC STRESS TEST    EC-ECHOCARDIOGRAM COMPLETE W/O CONT       Medical Decision Making:  Today's Assessment / Status / Plan:     Assessment  1.  Dyspnea on exertion, chronic, unclear etiology.  2.  Abnormal EKG with left anterior hemiblock.    Recommendation Discussion  1.  I had a detailed discussion with patient concerning her current symptomatology of exertional shortness of breath,, my uncertainty as to the  etiology.  2.  I reviewed her previous diagnostic tests with her including her MPI, EKG, echocardiogram, sleep study chest x-ray and PFTs.  3.  We will repeat echocardiogram and nuclear stress test to make any further recommendations.    Thank you for allowing me see this lady in consultation      Judy Waite M.D.  [address] [address]  VIA Facsimile: [phone]

## 2019-02-12 ENCOUNTER — HOSPITAL ENCOUNTER (OUTPATIENT)
Dept: RADIOLOGY | Facility: MEDICAL CENTER | Age: 74
End: 2019-02-12
Attending: PSYCHIATRY & NEUROLOGY
Payer: MEDICARE

## 2019-02-12 DIAGNOSIS — R53.81 DEBILITY: ICD-10-CM

## 2019-02-12 PROCEDURE — 72146 MRI CHEST SPINE W/O DYE: CPT

## 2019-02-26 ENCOUNTER — HOSPITAL ENCOUNTER (OUTPATIENT)
Dept: RADIOLOGY | Facility: MEDICAL CENTER | Age: 74
End: 2019-02-26
Attending: INTERNAL MEDICINE
Payer: MEDICARE

## 2019-02-26 ENCOUNTER — HOSPITAL ENCOUNTER (OUTPATIENT)
Dept: CARDIOLOGY | Facility: MEDICAL CENTER | Age: 74
End: 2019-02-26
Attending: INTERNAL MEDICINE
Payer: MEDICARE

## 2019-02-26 DIAGNOSIS — R06.09 DOE (DYSPNEA ON EXERTION): ICD-10-CM

## 2019-02-26 DIAGNOSIS — R94.31 ABNORMAL EKG: ICD-10-CM

## 2019-02-26 LAB
LV EJECT FRACT  99904: 65
LV EJECT FRACT MOD 2C 99903: 69.71
LV EJECT FRACT MOD 4C 99902: 61.71
LV EJECT FRACT MOD BP 99901: 65.49

## 2019-02-26 PROCEDURE — 93306 TTE W/DOPPLER COMPLETE: CPT

## 2019-02-26 PROCEDURE — 78452 HT MUSCLE IMAGE SPECT MULT: CPT | Mod: 26 | Performed by: INTERNAL MEDICINE

## 2019-02-26 PROCEDURE — 700111 HCHG RX REV CODE 636 W/ 250 OVERRIDE (IP)

## 2019-02-26 PROCEDURE — 93306 TTE W/DOPPLER COMPLETE: CPT | Mod: 26 | Performed by: INTERNAL MEDICINE

## 2019-02-26 PROCEDURE — A9502 TC99M TETROFOSMIN: HCPCS

## 2019-02-26 PROCEDURE — 93018 CV STRESS TEST I&R ONLY: CPT | Performed by: INTERNAL MEDICINE

## 2019-02-26 RX ORDER — REGADENOSON 0.08 MG/ML
INJECTION, SOLUTION INTRAVENOUS
Status: COMPLETED
Start: 2019-02-26 | End: 2019-02-26

## 2019-02-26 RX ADMIN — REGADENOSON 0.4 MG: 0.08 INJECTION, SOLUTION INTRAVENOUS at 09:35

## 2019-02-26 NOTE — PROGRESS NOTES
Patient presents to NM suite for cardiac stress test with MPI. Nursing goals identified: knowledge deficit, potential for anxiety r/t stress test, potential for compromised cardiac output. Care plan includes educating patient, reassurance and access to ACLS cart/team. Labs and ECG reviewed. No caffeine and NPO confirmed. Resting images attained and patient prepped for pharmacological stress study. Lexiscan given while patient ambulated on TM x 2 mins. Patient reported these symptoms: SOB, leg fatigue, flushed. Caffeinated beverage provided. Symptoms resolved.

## 2019-02-27 ENCOUNTER — TELEPHONE (OUTPATIENT)
Dept: CARDIOLOGY | Facility: MEDICAL CENTER | Age: 74
End: 2019-02-27

## 2019-02-28 ENCOUNTER — TELEPHONE (OUTPATIENT)
Dept: CARDIOLOGY | Facility: MEDICAL CENTER | Age: 74
End: 2019-02-28

## 2019-02-28 NOTE — TELEPHONE ENCOUNTER
stress test results   Received: Today   Message Contents   Sarah Diallo, Med Ass't  Anabel Tripathi RFerozN.   Phone Number: [phone]                  Pt calling for stress test results   # [phone]      Contacted patient, discussed NM and Echo (both normal).     Patient states she is still suffering from SOB.  She states she has SOB at rest and on exertion.  She does have a pulmonary doctor with most recent visit on 12/12/18.  Denies CP, edema, arm/jaw pain, cough/fever.     To VANESSA to advise on continued SOB

## 2019-02-28 NOTE — TELEPHONE ENCOUNTER
Tyler Melchor M.D. 2 minutes ago (9:09 AM)        Tell her that both echo and MPI are normal as before  No evidence of heart problems to explain her breathing problem  Should follow up with pulmonary        Contacted to patient to reiterate to follow up with pulmonary doctor.  Patient verbalizes understanding.

## 2019-02-28 NOTE — TELEPHONE ENCOUNTER
Tell her that both echo and MPI are normal as before  No evidence of heart problems to explain her breathing problem  Should follow up with pulmonary

## 2019-03-19 ENCOUNTER — HOSPITAL ENCOUNTER (OUTPATIENT)
Dept: RADIOLOGY | Facility: MEDICAL CENTER | Age: 74
End: 2019-03-19
Attending: NEUROLOGICAL SURGERY
Payer: MEDICARE

## 2019-03-19 DIAGNOSIS — G20.A1 PARKINSONS DISEASE: ICD-10-CM

## 2019-03-19 DIAGNOSIS — M47.812 CERVICAL SPONDYLOSIS WITHOUT MYELOPATHY: ICD-10-CM

## 2019-03-19 PROCEDURE — 70551 MRI BRAIN STEM W/O DYE: CPT

## 2019-03-19 PROCEDURE — 72141 MRI NECK SPINE W/O DYE: CPT

## 2019-03-19 PROCEDURE — 72040 X-RAY EXAM NECK SPINE 2-3 VW: CPT

## 2019-04-02 ENCOUNTER — HOSPITAL ENCOUNTER (OUTPATIENT)
Facility: MEDICAL CENTER | Age: 74
End: 2019-04-02
Attending: DERMATOLOGY
Payer: MEDICARE

## 2019-04-02 ENCOUNTER — OFFICE VISIT (OUTPATIENT)
Dept: DERMATOLOGY | Facility: IMAGING CENTER | Age: 74
End: 2019-04-02
Payer: MEDICARE

## 2019-04-02 DIAGNOSIS — D48.5 NEOPLASM OF UNCERTAIN BEHAVIOR OF SKIN: ICD-10-CM

## 2019-04-02 DIAGNOSIS — L71.9 ROSACEA: ICD-10-CM

## 2019-04-02 DIAGNOSIS — L82.1 SEBORRHEIC KERATOSES: ICD-10-CM

## 2019-04-02 PROCEDURE — 99212 OFFICE O/P EST SF 10 MIN: CPT | Mod: 25 | Performed by: DERMATOLOGY

## 2019-04-02 PROCEDURE — 99999 PR NO CHARGE: CPT | Performed by: DERMATOLOGY

## 2019-04-02 PROCEDURE — 11104 PUNCH BX SKIN SINGLE LESION: CPT | Performed by: DERMATOLOGY

## 2019-04-02 PROCEDURE — 88305 TISSUE EXAM BY PATHOLOGIST: CPT

## 2019-04-02 ASSESSMENT — ENCOUNTER SYMPTOMS
FEVER: 0
CHILLS: 0
WEIGHT LOSS: 0

## 2019-04-02 NOTE — PROGRESS NOTES
Dermatology Return Patient Visit    Chief Complaint   Patient presents with   • Annual Exam     GLYNN       Subjective:     HPI:   Ashanti Day is a 72 y.o. female presenting for    HPI/location: growth on the RT forearm   Time present: several days   Painful lesion: Yes to touch  Itching lesion: No  Enlarging lesion: No  Anything make it better or worse? Pressure hurts    Several other brown spots on the body  No itching/bleeding/pain  None changing she is aware of recently    Rosacea -face  Only redness/blood vessels  Denies any bumps  No treatments  Thinks it is worsening    History of skin cancer: Yes, Details:  Precancerous  skin lesion removed from face x 5 years ago   History of biopsies:Yes, Details:    History of blistering/severe sunburns:No  Family history of skin cancer:No  Family history of atypical moles:No        Past Medical History:   Diagnosis Date   • Anesthesia     nausea   • Arthritis    • Breast cancer (HCC)    • Bronchitis 1960's   • Cancer (HCC) 2012    ca left breast   • Fibroid uterus 1979    resulting in Hysterectomy   • Heart burn    • Hepatitis A     at  age 6 months   • High cholesterol    • Ovarian mass current-2012    on each ovary, uterine fibroids   • Pneumonia 2003   • Sleep apnea     CPAP   • Snoring     SLEEP STUDY DONE   • Unspecified urinary incontinence        Current Outpatient Prescriptions on File Prior to Visit   Medication Sig Dispense Refill   • VESICARE 10 MG tablet      • Famotidine (PEPCID PO) Take  by mouth.     • triamcinolone (KENALOG) 0.025 % ointment Apply 1 Application to affected area(s) 2 times a day. (Patient not taking: Reported on 2/8/2019) 30 g 1     No current facility-administered medications on file prior to visit.        No Known Allergies    No family history on file.    Social History     Social History   • Marital status:      Spouse name: N/A   • Number of children: N/A   • Years of education: N/A     Occupational History   • Not on  file.     Social History Main Topics   • Smoking status: Former Smoker     Packs/day: 1.00     Years: 35.00     Types: Cigarettes     Quit date: 1/5/1992   • Smokeless tobacco: Never Used   • Alcohol use No   • Drug use: No   • Sexual activity: Not on file     Other Topics Concern   • Family Contact Information Yes     Social History Narrative   • No narrative on file       Review of Systems   Constitutional: Negative for chills, fever and weight loss.   Skin: Negative for itching and rash.   All other systems reviewed and are negative.       Objective:     A full mucocutaneous exam was completed including: scalp, hair, ears, face, eyelids, conjunctiva, lips, gums/tongue/oropharynx, neck, chest breasts, abdomen, back, left and right upper extremities (including hands/digits and fingernails), left and right lower extremities (including feet/toes, toenails), buttocks, excluding external genitalia (patient refusal) with the following pertinent findings listed below. Remaining above-listed examined areas within normal limits / negative for rashes or lesions.    Physical Exam   Constitutional: She is oriented to person, place, and time and well-developed, well-nourished, and in no distress.   HENT:   Head: Normocephalic and atraumatic.       Right Ear: External ear normal.   Left Ear: External ear normal.   Nose: Nose normal.   Mouth/Throat: Oropharynx is clear and moist.   Eyes: Conjunctivae and lids are normal.   Neck: Normal range of motion. Neck supple.   Cardiovascular: Intact distal pulses.    Pulmonary/Chest: Effort normal.   Neurological: She is alert and oriented to person, place, and time.   Skin: Skin is warm and dry.        Psychiatric: Mood and affect normal.       DATA: none applicable to review    Assessment and Plan:     1. Neoplasm of uncertain behavior of skin - right forehead  Procedure Note   Procedure: Biopsy by shave technique  Location: as noted above  Size: as noted in exam  Preoperative  diagnosis:IDN, r/o BCC  Risks, benefits and alternatives of procedure discussed and written informed consent obtained. Time out completed. Area of biopsy prepped with alcohol. Anesthesia with 1% lidocaine with epinephrine administered with 30 gauge needle. Shave biopsy of the site performed. Hemostasis achieved with pressure and aluminum chloride. Vaseline applied to wound with bandage. Patient tolerated procedure well and there were no complications. The specimen was sent to the pathology lab by the staff. Wound care was discussed.  - Pathology Specimen; Future    2. Neoplasm of uncertain behavior of skin - right forearm  Procedure Note   Procedure: Biopsy by punch technique  Location: above  Preoperative diagnosis:DF vs leiomyoma vs other  Risks, benefits and alternatives of procedure discussed and written informed consent obtained. Time out completed. Area of biopsy prepped with alcohol. Anesthesia with 1% lidocaine with epinephrine administered with a 30 gauge needle. 5 mm punch biopsy of site performed. Hemostasis achieved with pressure and 4.0 prolene sutures. Vaseline applied to wound with bandage. Patient tolerated procedure well, and there were no complications.  The pathology specimen was sent to the lab via the staff.  Wound care was discussed with the patient.   - Pathology Specimen; Future    3. Seborrheic keratoses  - Benign-appearing nature of lesions discussed. Advised to return to clinic for any new or concerning changes.    4. Rosacea, erythematous telangiectatic type - slight worsening  - again, discussed option of Mirvaso or Rhofade, but patient declines   - discussed importance of regular sun protection/sunscreen use, SPF 30 or greater with broad spectrum coverage, need for reapplication every  minutes  - trigger avoidance  - discussed that the redness/flushing is more difficult to treat, and discussed other topicals vs BBL vs laser therapy    Followup: Return in about 1 year (around  4/2/2020), or pending results.    Priscila Lemus M.D.

## 2019-04-03 LAB — PATHOLOGY CONSULT NOTE: NORMAL

## 2019-04-04 ENCOUNTER — TELEPHONE (OUTPATIENT)
Dept: DERMATOLOGY | Facility: IMAGING CENTER | Age: 74
End: 2019-04-04

## 2019-04-04 NOTE — TELEPHONE ENCOUNTER
----- Message from Priscila Lemus M.D. sent at 4/4/2019 12:51 PM PDT -----  Sinan King,     Can you notify Ashanti via letter of benign results please?     Thank you!

## 2019-04-04 NOTE — LETTER
"April 4, 2019         Ashanti Day  [address]  [place] [address]        Dear Ashanti:      Below are the results from your recent visit: Your pathology results are benign.If you have any questions or concerns, please don't hesitate to call.    Resulted Orders   Histology Request   Result Value Ref Range    Pathology Request Sent to Histo    Pathology Specimen    Narrative    [facility] PATHOLOGY [facility].              [address] [place] [address]. [address], [place] [address]              Phone [phone]          Fax [phone]  Robert Quezada M.D.     Racheal Reis M.D.     Jakob Marcus M.D.                            Deana Baron M.D.   Andi Valdez D.O.     Racheal Post M.D.     Bob López M.D.    Patient:    ASHANTI DAY         Specimen    [id number]                                           #:      Copies to:                      SURGICAL PATHOLOGY CONSULTATION    FINAL DIAGNOSIS:    A. Forehead:         Benign intradermal melanocytic nevus.         The intradermal melanocytic nevus focally extends to the          transected shave biopsy margin.  B. Right forearm:         Benign dermatofibroma.         The dermatofibroma appears predominantly excised with very focal          extension to the shave biopsy margin.         See comment.    Comment: The slides are reviewed with Dr. Post with agreement on  the diagnoses.                                            Diagnosis performed by:                                      ROBERT QUEZADA MD  ------------------------------------------------------------------------  --  CODES: 2003x2    PREOPERATIVE DIAGNOSIS:  D48.5 Neoplasm of uncertain behavior of skin. Differential diagnosis:  A-IDN, rule out BCC; B-DF, rule out atypia.    SPECIMEN(S)  A. Forehead:  B. Right forearm:    GROSS DESCRIPTION:  A.  Received in formalin labeled with the two patient identifiers and  designated \"yosef Caba\" is a 0.4 x 0.3 x " "0.1 cm shave  biopsy, bisected. TS 1/[id number]    B.  Received in formalin labeled with the two patient identifiers and  designated \"Uyen Day, right forearm\" is a 0.5 cm in diameter  punch biopsy, bisected. TS 1/[id number] LG/km    MICROSCOPIC DESCRIPTION:  Microscopic examination was performed.  Please see diagnosis.          Report electronically signed by:  REECE MORALES MD  Diagnosis performed at: [facility]  Pathology  Department, [address] [place] [address]  [id number]      Printed 00/00/0000 [id number] UYEN DAY   Page 1 of 1 MRN#:[id number]             Sincerely,      Priscila Lemus M.D.    Electronically Signed  "

## 2019-04-09 ENCOUNTER — NON-PROVIDER VISIT (OUTPATIENT)
Dept: DERMATOLOGY | Facility: IMAGING CENTER | Age: 74
End: 2019-04-09
Payer: MEDICARE

## 2019-04-09 NOTE — PROGRESS NOTES
Ashanti Day is a 74 y.o. female here for a Non-Provider Visit for Suture Removal.    Sutures were placed by Dr Lemus on date: 04/02/2019  Skin is healed: Yes , a little irritated but ok. Dr Lemus did see patient   Provider notified if skin is not healed, or if there is redness, heat, pain, or drainage from incision: yes  Sutures removed.   Mastisol and steristips are placed: Yes    Advised to use emollient (vaseline, aquaphor, etc.) as needed, avoid peroxide and antibiotic ointment to reduce irritation.     Path report has been reviewed by provider.  Path report has been reviewed with patient.

## 2019-07-01 ENCOUNTER — HOSPITAL ENCOUNTER (OUTPATIENT)
Dept: RADIOLOGY | Facility: MEDICAL CENTER | Age: 74
End: 2019-07-01
Attending: FAMILY MEDICINE
Payer: MEDICARE

## 2019-07-01 DIAGNOSIS — Z12.39 SCREENING BREAST EXAMINATION: ICD-10-CM

## 2019-07-01 PROCEDURE — 77063 BREAST TOMOSYNTHESIS BI: CPT

## 2019-11-13 ENCOUNTER — HOSPITAL ENCOUNTER (OUTPATIENT)
Dept: RADIOLOGY | Facility: MEDICAL CENTER | Age: 74
End: 2019-11-13
Attending: FAMILY MEDICINE
Payer: MEDICARE

## 2019-11-13 DIAGNOSIS — S23.41XS SPRAIN OF COSTAL CARTILAGE, SEQUELA: ICD-10-CM

## 2019-11-13 PROCEDURE — 71101 X-RAY EXAM UNILAT RIBS/CHEST: CPT | Mod: RT

## 2020-03-19 ENCOUNTER — APPOINTMENT (OUTPATIENT)
Dept: RADIOLOGY | Facility: MEDICAL CENTER | Age: 75
End: 2020-03-19
Payer: MEDICARE

## 2020-07-09 ENCOUNTER — HOSPITAL ENCOUNTER (OUTPATIENT)
Dept: RADIOLOGY | Facility: MEDICAL CENTER | Age: 75
End: 2020-07-09
Attending: RADIOLOGY | Admitting: RADIOLOGY
Payer: MEDICARE

## 2020-07-09 DIAGNOSIS — G20.C ATYPICAL PARKINSONISM (HCC): ICD-10-CM

## 2020-07-09 PROCEDURE — A9584 IODINE I-123 IOFLUPANE: HCPCS

## 2021-01-14 DIAGNOSIS — Z23 NEED FOR VACCINATION: ICD-10-CM

## 2021-05-14 ENCOUNTER — HOSPITAL ENCOUNTER (OUTPATIENT)
Dept: RADIOLOGY | Facility: MEDICAL CENTER | Age: 76
End: 2021-05-14
Attending: PSYCHIATRY & NEUROLOGY
Payer: MEDICARE

## 2021-05-14 DIAGNOSIS — G90.3 MULTIPLE SYSTEM ATROPHY (HCC): ICD-10-CM

## 2021-05-14 DIAGNOSIS — G93.40 ENCEPHALOPATHY: ICD-10-CM

## 2021-05-14 PROCEDURE — 70551 MRI BRAIN STEM W/O DYE: CPT | Mod: ME

## 2021-09-30 ENCOUNTER — APPOINTMENT (OUTPATIENT)
Dept: RADIOLOGY | Facility: MEDICAL CENTER | Age: 76
End: 2021-09-30
Attending: EMERGENCY MEDICINE
Payer: MEDICARE

## 2021-09-30 ENCOUNTER — HOSPITAL ENCOUNTER (EMERGENCY)
Facility: MEDICAL CENTER | Age: 76
End: 2021-09-30
Attending: EMERGENCY MEDICINE
Payer: MEDICARE

## 2021-09-30 VITALS
BODY MASS INDEX: 23.52 KG/M2 | HEART RATE: 80 BPM | WEIGHT: 155.2 LBS | TEMPERATURE: 97.1 F | HEIGHT: 68 IN | OXYGEN SATURATION: 95 % | SYSTOLIC BLOOD PRESSURE: 170 MMHG | RESPIRATION RATE: 17 BRPM | DIASTOLIC BLOOD PRESSURE: 80 MMHG

## 2021-09-30 DIAGNOSIS — M79.604 PAIN OF RIGHT LOWER EXTREMITY: ICD-10-CM

## 2021-09-30 PROCEDURE — 99285 EMERGENCY DEPT VISIT HI MDM: CPT

## 2021-09-30 PROCEDURE — 72131 CT LUMBAR SPINE W/O DYE: CPT | Mod: ME

## 2021-09-30 PROCEDURE — 73700 CT LOWER EXTREMITY W/O DYE: CPT | Mod: RT,MG

## 2021-09-30 PROCEDURE — 96374 THER/PROPH/DIAG INJ IV PUSH: CPT

## 2021-09-30 PROCEDURE — 73562 X-RAY EXAM OF KNEE 3: CPT | Mod: RT

## 2021-09-30 PROCEDURE — 700111 HCHG RX REV CODE 636 W/ 250 OVERRIDE (IP): Performed by: EMERGENCY MEDICINE

## 2021-09-30 PROCEDURE — 73502 X-RAY EXAM HIP UNI 2-3 VIEWS: CPT | Mod: RT

## 2021-09-30 RX ORDER — PREGABALIN 25 MG/1
25 CAPSULE ORAL 3 TIMES DAILY
Qty: 90 CAPSULE | Refills: 0 | Status: SHIPPED | OUTPATIENT
Start: 2021-09-30 | End: 2021-10-30

## 2021-09-30 RX ORDER — MORPHINE SULFATE 4 MG/ML
4 INJECTION, SOLUTION INTRAMUSCULAR; INTRAVENOUS ONCE
Status: COMPLETED | OUTPATIENT
Start: 2021-09-30 | End: 2021-09-30

## 2021-09-30 RX ORDER — HYDROCODONE BITARTRATE AND ACETAMINOPHEN 5; 325 MG/1; MG/1
1 TABLET ORAL EVERY 4 HOURS PRN
Qty: 12 TABLET | Refills: 0 | Status: SHIPPED | OUTPATIENT
Start: 2021-09-30 | End: 2021-10-06

## 2021-09-30 RX ADMIN — MORPHINE SULFATE 4 MG: 4 INJECTION INTRAVENOUS at 18:37

## 2021-09-30 NOTE — ED TRIAGE NOTES
Pt to triage w daughter via w/c; c/o LLE pain x3wks, progressivley worsening. Pt has hx multiple system atrophy/MSA; dx'd x3y ago and sees neurologist Dr Rajan. Pt has graciela evans, per joshua.

## 2021-10-01 NOTE — ED PROVIDER NOTES
ED Provider Note    CHIEF COMPLAINT  Chief Complaint   Patient presents with   • Leg Pain     LLE       HPI  Ashanti Day is a 76 y.o. female who presents with chief complaint of right hip pain.  Patient reports pain for the last 3 weeks.  Patient has a history of multisystem atrophy and chronic muscle cramps.  She is on muscle relaxers for this but reports that they do not seem to be helping.  Patient is wheelchair-bound but does attempt to transfer from her wheelchair to her bed.  She has had falls secondary to this.  She denies any head trauma.  Patient reports weeks ago she fell on her back and it was very painful but the back pain is since resolved.  She reports that her pain is most pronounced in her right lateral upper leg and is worse whenever she tries to move her leg.  She reports that today she started developing some very mild associated back pain when she tried to move her leg.  She has a longstanding history of incontinence, this is not worsened or changed.  Patient denies any associated fevers or chills.    REVIEW OF SYSTEMS  ROS    See HPI for further details. All other systems are negative.     PAST MEDICAL HISTORY   has a past medical history of Anesthesia, Arthritis, Breast cancer (HCC), Bronchitis ('s), Cancer (HCC) (), Fibroid uterus (), Heart burn, Hepatitis A, High cholesterol, Ovarian mass (current-), Pneumonia (), Sleep apnea, Snoring, and Unspecified urinary incontinence.    SOCIAL HISTORY  Social History     Tobacco Use   • Smoking status: Former Smoker     Packs/day: 1.00     Years: 35.00     Pack years: 35.00     Types: Cigarettes     Quit date: 1992     Years since quittin.7   • Smokeless tobacco: Never Used   Substance and Sexual Activity   • Alcohol use: No   • Drug use: No   • Sexual activity: Not on file       SURGICAL HISTORY   has a past surgical history that includes hysterectomy radical (); lumbar decompression (); shoulder surgery  (july 2011); other orthopedic surgery (1977); laparoscopy (1/5/2012); exploratory laparotomy (1/5/2012); breast biopsy (11/20/2012); node biopsy sentinel (12/7/2012); breast biopsy (12/7/2012); radiation therapy plan simple; lumpectomy; and eswt (Right, 5/13/2015).    CURRENT MEDICATIONS  Home Medications    **Home medications have not yet been reviewed for this encounter**         ALLERGIES  No Known Allergies    PHYSICAL EXAM  Vitals:    09/30/21 1550   BP: (!) 161/95   Pulse: 82   Resp: 18   Temp: (!) 35.7 °C (96.3 °F)   SpO2: 96%       Physical Exam  Constitutional:       Appearance: She is well-developed.   HENT:      Head: Normocephalic and atraumatic.   Eyes:      Conjunctiva/sclera: Conjunctivae normal.   Pulmonary:      Effort: Pulmonary effort is normal.   Musculoskeletal:      Cervical back: Normal range of motion and neck supple.      Comments: Tenderness to palpation overlying the right greater trochanter.  Pain is provoked on logroll of the right hip.  No major pain evoked on axial loading.  Considerable pain on resisted hip flexion.  Only minimal pain on passive hip flexion.  EHL is 5 out of 5.  Sensation is intact and equal bilaterally.  Distal pulses are 2+.  Cervical thoracic and lumbar spine are clear of any tenderness palpation or bony abnormality.   Skin:     General: Skin is warm.   Neurological:      Mental Status: She is alert and oriented to person, place, and time.   Psychiatric:         Behavior: Behavior normal.           DIAGNOSTIC STUDIES / PROCEDURES      RADIOLOGY  CT-HIP W/O PLUS RECONS RIGHT   Final Result      1.  There is no acute fracture or malalignment of the pelvis or either proximal femur.   2.  There is mild bilateral hip joint degenerative change.   3.  There is a small infraumbilical abdominal wall hernia containing fat and unobstructed loops of bowel.      CT-LSPINE W/O PLUS RECONS   Final Result      1.  No evidence for acute lumbar spine fracture.      2.  Chronic  inferior endplate compression fracture of L1 resulting in 30% height loss.      DX-KNEE 3 VIEWS RIGHT   Final Result      No evidence of acute fracture or dislocation.      DX-HIP-COMPLETE - UNILATERAL 2+ RIGHT   Final Result      No evidence of fracture or arthropathy.              COURSE & MEDICAL DECISION MAKING  Pertinent Labs & Imaging studies reviewed. (See chart for details)  Patient her symptoms most consistent with quadriceps injury.  Possible occult hip fracture.  Will check x-ray.  She does have prior falls which could explain the pain.  It does not appear to be radicular especially given the provocative testing with logroll and tenderness palpation of the greater trochanter.  My suspicion of DVT is very low, she does not have any pain in the medial aspect of her leg, there is no considerable edema.  Patient x-rays are unremarkable, will CT for possible occult fracture.  CTs are unremarkable.  I offered to check an ultrasound however my suspicion of DVT remains incredibly low in this patient with easily provokable pain and tenderness palpation overlying the greater trochanter and pain on resisted flexion of the thigh.  I suspect this is likely a muscle tear or possible muscle spasm especially given patient's neurologic issue.  Patient would benefit from an MRI however I do not know if this has to happen immediately as patient has had symptoms for quite some time.  Patient case discussed with her primary care physician, they will order an MRI as an outpatient and see patient tomorrow.  Family is comfortable with this plan as well as patient.  Return precautions discussed.    The patient will return for worsening symptoms and is stable at the time of discharge. The patient verbalizes understanding and will comply.    FINAL IMPRESSION    1. Pain of right lower extremity            Electronically signed by: Melecio Jesus M.D., 9/30/2021 5:14 PM

## 2021-10-01 NOTE — ED NOTES
Pt resting on yvonne, xray completed, pt in no acute distress, pt provided call light, instructed to call if needing any assistance, instructed not to get up by self, wheelchair from home at bedside, pt states she lives alone, yvonne in lowest position.

## 2021-10-01 NOTE — DISCHARGE INSTRUCTIONS
Your x-rays and CTs were reassuring.  I discussed your case with your primary care physician, we will start you on some Lyrica that may help with your pain, I have also given you a very short course of Norco that you can take sparingly for breakthrough pain.  Your primary care physician is going to work on getting your MRIs scheduled, you should receive a call from their office tomorrow morning.

## 2021-10-01 NOTE — ED NOTES
Pt provided with discharge paper work, follow up instructions, and paper prescriptions which ERP states is due to the system being down. Pt and daughter declines questions at this time.

## 2021-10-22 ENCOUNTER — HOSPITAL ENCOUNTER (EMERGENCY)
Facility: MEDICAL CENTER | Age: 76
End: 2021-10-22
Attending: EMERGENCY MEDICINE
Payer: MEDICARE

## 2021-10-22 ENCOUNTER — APPOINTMENT (OUTPATIENT)
Dept: RADIOLOGY | Facility: MEDICAL CENTER | Age: 76
End: 2021-10-22
Attending: EMERGENCY MEDICINE
Payer: MEDICARE

## 2021-10-22 ENCOUNTER — APPOINTMENT (OUTPATIENT)
Dept: RADIOLOGY | Facility: MEDICAL CENTER | Age: 76
End: 2021-10-22
Attending: FAMILY MEDICINE
Payer: MEDICARE

## 2021-10-22 VITALS
DIASTOLIC BLOOD PRESSURE: 87 MMHG | HEIGHT: 68 IN | RESPIRATION RATE: 16 BRPM | SYSTOLIC BLOOD PRESSURE: 174 MMHG | WEIGHT: 150 LBS | BODY MASS INDEX: 22.73 KG/M2 | TEMPERATURE: 98.2 F | OXYGEN SATURATION: 96 % | HEART RATE: 85 BPM

## 2021-10-22 DIAGNOSIS — M54.50 LOW BACK PAIN, UNSPECIFIED BACK PAIN LATERALITY, UNSPECIFIED CHRONICITY, UNSPECIFIED WHETHER SCIATICA PRESENT: ICD-10-CM

## 2021-10-22 DIAGNOSIS — S80.01XA CONTUSION OF RIGHT KNEE, INITIAL ENCOUNTER: ICD-10-CM

## 2021-10-22 DIAGNOSIS — S09.90XA CLOSED HEAD INJURY, INITIAL ENCOUNTER: ICD-10-CM
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
## 2021-10-22 PROCEDURE — 70450 CT HEAD/BRAIN W/O DYE: CPT | Mod: ME

## 2021-10-22 PROCEDURE — 700102 HCHG RX REV CODE 250 W/ 637 OVERRIDE(OP): Performed by: EMERGENCY MEDICINE

## 2021-10-22 PROCEDURE — A9270 NON-COVERED ITEM OR SERVICE: HCPCS | Performed by: EMERGENCY MEDICINE

## 2021-10-22 PROCEDURE — 73564 X-RAY EXAM KNEE 4 OR MORE: CPT | Mod: RT

## 2021-10-22 PROCEDURE — 72148 MRI LUMBAR SPINE W/O DYE: CPT | Mod: MH

## 2021-10-22 PROCEDURE — 99284 EMERGENCY DEPT VISIT MOD MDM: CPT

## 2021-10-22 RX ORDER — ACETAMINOPHEN 500 MG
1000 TABLET ORAL ONCE
Status: COMPLETED | OUTPATIENT
Start: 2021-10-22 | End: 2021-10-22

## 2021-10-22 RX ADMIN — ACETAMINOPHEN 1000 MG: 500 TABLET ORAL at 14:40

## 2021-10-22 NOTE — ED NOTES
Patient discharged. All discharged instructions reviewed. Patient verbalizes understanding.  Patient feels safe going home. Advised to come back for new or worsening symptoms.     Pt arranged RTC for ride home.

## 2021-10-22 NOTE — ED NOTES
Pt back imaging, Pt medicated as directed by AMARIS castellano, poc update given to pt. Further orders and dispo pending at this time. No further questions from pt at this time

## 2021-10-22 NOTE — ED NOTES
Pt arrived via [facility].    Pt is non ambulatory and experienced a ground level fall. Pt has a contusion on right temporal and a laceration on her right knee.     Pt denies being on blood thinners/ASA.    Bed in lowest position.  Call light within reach.

## 2021-10-22 NOTE — ED NOTES
Pt arrived via [facility].     Pt is non ambulatory and experienced a ground level fall. Pt has a contusion on right temporal lobe and a laceration on her right knee.     Pt denies being on blood thinners/ASA.     Bed in lowest position.   Call light within reach.

## 2021-10-22 NOTE — ED PROVIDER NOTES
"ED Provider Note    Scribed for Kj Goddard M.D. by Tori London. 10/22/2021  2:01 PM    Primary care provider: Judy Waite M.D.  Means of arrival: EMS  History obtained from: Patient  History limited by: None    CHIEF COMPLAINT  Chief Complaint   Patient presents with    GLF       HPI  Ashanti Day is a 76 y.o. female who presents to the Emergency Department following a ground level fall where the patient attempted to bear most of her weight on her \"bad leg\" (right). She has an associated contusion to the right temporal forehead and the right knee. She seems fatigued when relaying information of the event, but denies any associated loss of consciousness, headaches, fevers, confusions, chest pain, shortness of breath, nausea, emesis, or numbness/tingling in any of the extremities. She admits that she lives alone, but is able to care for herself adequately. She is not on any blood thinners, and has not attempted to alleviate her symptoms with Tylenol or Ibuprofen before arriving.     REVIEW OF SYSTEMS  Pertinent positives include falls, contusion to the right temporal forehead and the right knee. Pertinent negatives include no oss of consciousness, headaches, fevers, confusions, chest pain, shortness of breath, nausea, emesis, or numbness/tingling in any of the extremities.  All other systems reviewed and negative.    PAST MEDICAL HISTORY   has a past medical history of Anesthesia, Arthritis, Breast cancer (HCC), Bronchitis (1960's), Cancer (HCC) (2012), Fibroid uterus (1979), Heart burn, Hepatitis A, High cholesterol, Ovarian mass (current-2012), Pneumonia (2003), Sleep apnea, Snoring, and Unspecified urinary incontinence.    SURGICAL HISTORY   has a past surgical history that includes hysterectomy radical (1979); lumbar decompression (1995); shoulder surgery (july 2011); other orthopedic surgery (1977); laparoscopy (1/5/2012); exploratory laparotomy (1/5/2012); breast biopsy " "(2012); node biopsy sentinel (2012); breast biopsy (2012); radiation therapy plan simple; lumpectomy; and eswt (Right, 2015).    SOCIAL HISTORY  Social History     Tobacco Use    Smoking status: Former Smoker     Packs/day: 1.00     Years: 35.00     Pack years: 35.00     Types: Cigarettes     Quit date: 1992     Years since quittin.8    Smokeless tobacco: Never Used   Substance Use Topics    Alcohol use: No    Drug use: No      Social History     Substance and Sexual Activity   Drug Use No       FAMILY HISTORY  None pertinent to the patient's case    CURRENT MEDICATIONS  Current Outpatient Medications   Medication Instructions    Famotidine (PEPCID PO) Oral    pregabalin (LYRICA) 25 mg, Oral, 3 TIMES DAILY    triamcinolone (KENALOG) 0.025 % ointment 1 Application, Topical, 2 TIMES DAILY    VESICARE 10 MG tablet No dose, route, or frequency recorded.     ALLERGIES  No Active Allergies    PHYSICAL EXAM  VITAL SIGNS: BP (!) 169/79   Temp 36.3 °C (97.4 °F) (Temporal)   Resp 18   Ht 1.727 m (5' 8\")   Wt 68 kg (150 lb)   LMP 1979   BMI 22.81 kg/m²     Constitutional: Well developed, Well nourished, no acute distress, Non-toxic appearance.   HENT: Small hematoma and ecchymosis on the right temporal area. Normocephalic,  Bilateral external ears normal, Oropharynx moist, No oral exudates.   Eyes: PERRLA, EOMI, Conjunctiva normal, No discharge.   Neck: No tenderness, Supple, No stridor.   Lymphatic: No lymphadenopathy noted.   Cardiovascular: Normal heart rate, Normal rhythm.   Thorax & Lungs: Clear to auscultation bilaterally, No respiratory distress, No wheezing, No crackles.   Abdomen: Soft, No tenderness, No masses, No pulsatile masses.   Back: No C/T/L spine tenderness.   Skin: Slight ecchymosis to the right anterior knee. Warm, Dry, No erythema, No rash.   Extremities:, decreased range of motion to the lower right extremity secondary to pain. No effusion. No edema No cyanosis. "   Musculoskeletal: No tenderness to palpation or major deformities noted.  Intact distal pulses  Neurologic: Awake, alert. Moves all extremities spontaneously.  Psychiatric: Affect normal, Judgment normal, Mood normal.      RADIOLOGY  CT-HEAD W/O   Final Result      1.  Diffuse cortical atrophy.   2.  No acute intracranial hemorrhage or territorial infarct.   3.  Chronic RIGHT maxillary sinus disease.      DX-KNEE COMPLETE 4+ RIGHT   Final Result      No radiographic evidence of acute traumatic injury      Ossification adjacent to the fibular styloid concerning for remote avulsion fracture      Mild quadriceps enthesopathy        The radiologist's interpretation of all radiological studies have been reviewed by me.      COURSE & MEDICAL DECISION MAKING  Pertinent Labs & Imaging studies reviewed. (See chart for details)    2:01 PM - Patient seen and examined at bedside. Patient will be treated with Tylenol. Ordered CT-head without and DX-knee right to evaluate her symptoms.     4:19 PM - Patient reevaluated at bedside. Patient is ambulatory with no increase pain. Discussed plan for discharge; and to return to the [facility] ED with any new or worsening symptoms, including confusion and loss of sensation. Patient was given the opportunity for questions. I addressed all questions or concerns at this time and they verbalize agreement to the plan of care.     I verified that the patient was wearing a mask and I was wearing appropriate PPE every time I entered the room. The patient's mask was on the patient at all times during my encounter except for a brief view of the oropharynx.    Decision Making:  Status post fall with a head injury and knee pain, x-rays are negative, the patient was able to ambulate afterwards, will discharge patient home, have the patient return with worsening symptoms.    The patient will return for new or worsening symptoms and is stable at the time of discharge.    The patient is referred to a  primary physician for blood pressure management, diabetic screening, and for all other preventative health concerns.    DISPOSITION:  Patient will be discharged home in stable condition.    FOLLOW UP:  [facility], Emergency Dept  [address]  [place] [id number] [phone]    If symptoms worsen      OUTPATIENT MEDICATIONS:  Discharge Medication List as of 10/22/2021  4:39 PM            FINAL IMPRESSION  1. Fall, initial encounter    2. Closed head injury, initial encounter    3. Contusion of right knee, initial encounter          ITori (Jojo), am scribing for, and in the presence of, Kj Goddard M.D..    Electronically signed by: Tori London (Jojo), 10/22/2021    IKj M.D. personally performed the services described in this documentation, as scribed by Tori London in my presence, and it is both accurate and complete.    The note accurately reflects work and decisions made by me.  Kj Goddard M.D.  10/22/2021  9:24 PM

## 2021-10-25 ENCOUNTER — APPOINTMENT (OUTPATIENT)
Dept: RADIOLOGY | Facility: MEDICAL CENTER | Age: 76
End: 2021-10-25
Attending: FAMILY MEDICINE
Payer: MEDICARE

## 2022-02-08 PROBLEM — R32 URINARY INCONTINENCE: Status: ACTIVE | Noted: 2022-02-08

## 2022-02-08 PROBLEM — R03.0 ELEVATED BLOOD PRESSURE READING: Status: ACTIVE | Noted: 2022-02-08

## 2022-02-08 PROBLEM — J30.0 VASOMOTOR RHINITIS: Status: ACTIVE | Noted: 2022-02-08

## 2022-02-08 PROBLEM — R47.1 DYSARTHRIA: Status: ACTIVE | Noted: 2022-02-08

## 2022-02-08 PROBLEM — G90.3 MULTIPLE SYSTEM ATROPHY (HCC): Status: ACTIVE | Noted: 2022-02-08

## 2022-02-08 PROBLEM — K21.9 GERD (GASTROESOPHAGEAL REFLUX DISEASE): Status: ACTIVE | Noted: 2022-02-08

## 2022-03-15 PROBLEM — I10 BENIGN ESSENTIAL HTN: Status: ACTIVE | Noted: 2022-03-15

## 2022-04-26 PROBLEM — M62.830 SPASM OF MUSCLE OF LOWER BACK: Status: ACTIVE | Noted: 2022-04-26

## 2022-06-21 PROBLEM — U07.1 COVID-19 VIRUS INFECTION: Status: ACTIVE | Noted: 2022-06-21

## 2022-06-21 PROBLEM — Z91.81 HISTORY OF FALLING: Status: ACTIVE | Noted: 2022-06-21

## 2022-07-26 PROBLEM — Z86.16 HISTORY OF COVID-19: Status: ACTIVE | Noted: 2022-07-26

## 2022-09-13 PROBLEM — Z86.16 HISTORY OF COVID-19: Status: RESOLVED | Noted: 2022-07-26 | Resolved: 2022-09-13

## 2022-09-13 PROBLEM — R06.09 DOE (DYSPNEA ON EXERTION): Status: RESOLVED | Noted: 2019-02-08 | Resolved: 2022-09-13

## 2022-09-13 PROBLEM — R03.0 ELEVATED BLOOD PRESSURE READING: Status: RESOLVED | Noted: 2022-02-08 | Resolved: 2022-09-13

## 2022-09-13 PROBLEM — R06.02 SHORTNESS OF BREATH: Status: RESOLVED | Noted: 2018-12-12 | Resolved: 2022-09-13

## 2022-09-13 PROBLEM — U07.1 COVID-19 VIRUS INFECTION: Status: RESOLVED | Noted: 2022-06-21 | Resolved: 2022-09-13

## 2022-09-13 PROBLEM — R94.31 ABNORMAL EKG: Status: RESOLVED | Noted: 2019-02-08 | Resolved: 2022-09-13

## 2022-11-15 PROBLEM — J30.0 VASOMOTOR RHINITIS: Status: RESOLVED | Noted: 2022-02-08 | Resolved: 2022-11-15
